# Patient Record
Sex: FEMALE | Race: WHITE | NOT HISPANIC OR LATINO | Employment: UNEMPLOYED | URBAN - METROPOLITAN AREA
[De-identification: names, ages, dates, MRNs, and addresses within clinical notes are randomized per-mention and may not be internally consistent; named-entity substitution may affect disease eponyms.]

---

## 2018-02-27 LAB
BILIRUB SERPL-MCNC: NEGATIVE MG/DL
BLOOD URINE, POC: NEGATIVE
CLARITY, POC UA: NORMAL
COLOR, POC UA: YELLOW
GLUCOSE UR QL STRIP: NEGATIVE
KETONES UR QL STRIP: NEGATIVE
LEUKOCYTE EST, POC UA: NORMAL
NITRITE, POC UA: NEGATIVE
PH, POC UA: 8
POC BETA-HCG (QUAL): NEGATIVE
PROTEIN, POC: NORMAL
SPECIFIC GRAVITY, POC UA: 1.01
UROBILINOGEN, POC UA: NORMAL

## 2019-04-19 ENCOUNTER — HOSPITAL ENCOUNTER (OUTPATIENT)
Dept: NUTRITION | Facility: HOSPITAL | Age: 29
End: 2019-04-22
Attending: INTERNAL MEDICINE | Admitting: INTERNAL MEDICINE

## 2019-04-19 LAB
ABS NEUT (OLG): 5.94 X10(3)/MCL (ref 2.1–9.2)
ALBUMIN SERPL-MCNC: 3.9 GM/DL (ref 3.4–5)
ALBUMIN/GLOB SERPL: 0.9 {RATIO}
ALP SERPL-CCNC: 74 UNIT/L (ref 38–126)
ALT SERPL-CCNC: 21 UNIT/L (ref 12–78)
AMPHET UR QL SCN: NORMAL
APPEARANCE, UA: CLEAR
AST SERPL-CCNC: 16 UNIT/L (ref 15–37)
B-HCG SERPL QL: NEGATIVE
BACTERIA SPEC CULT: ABNORMAL /HPF
BARBITURATE SCN PRESENT UR: NORMAL
BASOPHILS # BLD AUTO: 0.1 X10(3)/MCL (ref 0–0.2)
BASOPHILS NFR BLD AUTO: 1 %
BENZODIAZ UR QL SCN: NORMAL
BILIRUB SERPL-MCNC: 0.2 MG/DL (ref 0.2–1)
BILIRUB UR QL STRIP: NEGATIVE
BILIRUBIN DIRECT+TOT PNL SERPL-MCNC: 0.1 MG/DL (ref 0–0.2)
BILIRUBIN DIRECT+TOT PNL SERPL-MCNC: 0.1 MG/DL (ref 0–0.8)
BUN SERPL-MCNC: 11 MG/DL (ref 7–18)
CALCIUM SERPL-MCNC: 9.5 MG/DL (ref 8.5–10.1)
CANNABINOIDS UR QL SCN: NORMAL
CHLORIDE SERPL-SCNC: 106 MMOL/L (ref 98–107)
CO2 SERPL-SCNC: 25 MMOL/L (ref 21–32)
COCAINE UR QL SCN: NORMAL
COLOR UR: YELLOW
CREAT SERPL-MCNC: 0.68 MG/DL (ref 0.55–1.02)
EOSINOPHIL # BLD AUTO: 0.2 X10(3)/MCL (ref 0–0.9)
EOSINOPHIL NFR BLD AUTO: 2 %
ERYTHROCYTE [DISTWIDTH] IN BLOOD BY AUTOMATED COUNT: 15.5 % (ref 11.5–17)
ETHANOL SERPL-MCNC: 120 MG/DL (ref 0–3)
GLOBULIN SER-MCNC: 4.2 GM/DL (ref 2.4–3.5)
GLUCOSE (UA): NEGATIVE
GLUCOSE SERPL-MCNC: 95 MG/DL (ref 74–106)
HAV IGM SERPL QL IA: NEGATIVE
HBV CORE IGM SERPL QL IA: NEGATIVE
HBV SURFACE AG SERPL QL IA: NEGATIVE
HCT VFR BLD AUTO: 41.7 % (ref 37–47)
HCV AB SERPL QL IA: NEGATIVE
HEPATITIS PANEL INTERP: NORMAL
HGB BLD-MCNC: 13.2 GM/DL (ref 12–16)
HGB UR QL STRIP: NEGATIVE
HIV 1+2 AB+HIV1 P24 AG SERPL QL IA: NEGATIVE
KETONES UR QL STRIP: NEGATIVE
LEUKOCYTE ESTERASE UR QL STRIP: ABNORMAL
LYMPHOCYTES # BLD AUTO: 2.6 X10(3)/MCL (ref 0.6–4.6)
LYMPHOCYTES NFR BLD AUTO: 28 %
MCH RBC QN AUTO: 28.3 PG (ref 27–31)
MCHC RBC AUTO-ENTMCNC: 31.7 GM/DL (ref 33–36)
MCV RBC AUTO: 89.3 FL (ref 80–94)
MONOCYTES # BLD AUTO: 0.4 X10(3)/MCL (ref 0.1–1.3)
MONOCYTES NFR BLD AUTO: 4 %
NEUTROPHILS # BLD AUTO: 5.94 X10(3)/MCL (ref 2.1–9.2)
NEUTROPHILS NFR BLD AUTO: 65 %
NITRITE UR QL STRIP: NEGATIVE
OPIATES UR QL SCN: NORMAL
PCP UR QL: NORMAL
PH UR STRIP.AUTO: 6 [PH] (ref 5–7.5)
PH UR STRIP: >=9 [PH] (ref 5–9)
PLATELET # BLD AUTO: 408 X10(3)/MCL (ref 130–400)
PMV BLD AUTO: 10.4 FL (ref 9.4–12.4)
POTASSIUM SERPL-SCNC: 4 MMOL/L (ref 3.5–5.1)
PROT SERPL-MCNC: 8.1 GM/DL (ref 6.4–8.2)
PROT UR QL STRIP: NEGATIVE
RBC # BLD AUTO: 4.67 X10(6)/MCL (ref 4.2–5.4)
RBC #/AREA URNS HPF: ABNORMAL /[HPF]
SODIUM SERPL-SCNC: 140 MMOL/L (ref 136–145)
SP GR FLD REFRACTOMETRY: 1.01 (ref 1–1.03)
SP GR UR STRIP: 1.01 (ref 1–1.03)
SQUAMOUS EPITHELIAL, UA: 11 /HPF (ref 0–4)
UROBILINOGEN UR STRIP-ACNC: 0.2
WBC # SPEC AUTO: 9.2 X10(3)/MCL (ref 4.5–11.5)
WBC #/AREA URNS HPF: 9 /HPF (ref 0–3)

## 2019-04-20 LAB
ABS NEUT (OLG): 6.92 X10(3)/MCL (ref 2.1–9.2)
BASOPHILS # BLD AUTO: 0.1 X10(3)/MCL (ref 0–0.2)
BASOPHILS NFR BLD AUTO: 1 %
BUN SERPL-MCNC: 10 MG/DL (ref 7–18)
CALCIUM SERPL-MCNC: 8.1 MG/DL (ref 8.5–10.1)
CHLORIDE SERPL-SCNC: 109 MMOL/L (ref 98–107)
CO2 SERPL-SCNC: 21 MMOL/L (ref 21–32)
CREAT SERPL-MCNC: 0.71 MG/DL (ref 0.55–1.02)
CREAT/UREA NIT SERPL: 14.1
EOSINOPHIL # BLD AUTO: 0 X10(3)/MCL (ref 0–0.9)
EOSINOPHIL NFR BLD AUTO: 0 %
ERYTHROCYTE [DISTWIDTH] IN BLOOD BY AUTOMATED COUNT: 15.4 % (ref 11.5–17)
GLUCOSE SERPL-MCNC: 87 MG/DL (ref 74–106)
HCT VFR BLD AUTO: 37.1 % (ref 37–47)
HGB BLD-MCNC: 11.5 GM/DL (ref 12–16)
LYMPHOCYTES # BLD AUTO: 2.4 X10(3)/MCL (ref 0.6–4.6)
LYMPHOCYTES NFR BLD AUTO: 23 %
MCH RBC QN AUTO: 29 PG (ref 27–31)
MCHC RBC AUTO-ENTMCNC: 31 GM/DL (ref 33–36)
MCV RBC AUTO: 93.7 FL (ref 80–94)
MONOCYTES # BLD AUTO: 0.8 X10(3)/MCL (ref 0.1–1.3)
MONOCYTES NFR BLD AUTO: 8 %
NEUTROPHILS # BLD AUTO: 6.92 X10(3)/MCL (ref 2.1–9.2)
NEUTROPHILS NFR BLD AUTO: 66 %
PLATELET # BLD AUTO: 333 X10(3)/MCL (ref 130–400)
PMV BLD AUTO: 10.8 FL (ref 9.4–12.4)
POTASSIUM SERPL-SCNC: 3.7 MMOL/L (ref 3.5–5.1)
RBC # BLD AUTO: 3.96 X10(6)/MCL (ref 4.2–5.4)
SODIUM SERPL-SCNC: 140 MMOL/L (ref 136–145)
WBC # SPEC AUTO: 10.4 X10(3)/MCL (ref 4.5–11.5)

## 2019-04-21 LAB
ABS NEUT (OLG): 8.62 X10(3)/MCL (ref 2.1–9.2)
ALBUMIN SERPL-MCNC: 3.2 GM/DL (ref 3.4–5)
ALBUMIN/GLOB SERPL: 1 RATIO (ref 1.1–2)
ALP SERPL-CCNC: 57 UNIT/L (ref 38–126)
ALT SERPL-CCNC: 15 UNIT/L (ref 12–78)
AST SERPL-CCNC: 10 UNIT/L (ref 15–37)
BASOPHILS # BLD AUTO: 0.1 X10(3)/MCL (ref 0–0.2)
BASOPHILS NFR BLD AUTO: 0 %
BILIRUB SERPL-MCNC: 0.4 MG/DL (ref 0.2–1)
BILIRUBIN DIRECT+TOT PNL SERPL-MCNC: 0.1 MG/DL (ref 0–0.5)
BILIRUBIN DIRECT+TOT PNL SERPL-MCNC: 0.3 MG/DL (ref 0–0.8)
BUN SERPL-MCNC: 9 MG/DL (ref 7–18)
CALCIUM SERPL-MCNC: 8.7 MG/DL (ref 8.5–10.1)
CHLORIDE SERPL-SCNC: 107 MMOL/L (ref 98–107)
CO2 SERPL-SCNC: 24 MMOL/L (ref 21–32)
CREAT SERPL-MCNC: 0.72 MG/DL (ref 0.55–1.02)
EOSINOPHIL # BLD AUTO: 0 X10(3)/MCL (ref 0–0.9)
EOSINOPHIL NFR BLD AUTO: 0 %
ERYTHROCYTE [DISTWIDTH] IN BLOOD BY AUTOMATED COUNT: 15.4 % (ref 11.5–17)
GLOBULIN SER-MCNC: 3.3 GM/DL (ref 2.4–3.5)
GLUCOSE SERPL-MCNC: 92 MG/DL (ref 74–106)
HCT VFR BLD AUTO: 34.8 % (ref 37–47)
HGB BLD-MCNC: 11.2 GM/DL (ref 12–16)
LYMPHOCYTES # BLD AUTO: 2.2 X10(3)/MCL (ref 0.6–4.6)
LYMPHOCYTES NFR BLD AUTO: 18 %
MAGNESIUM SERPL-MCNC: 1.9 MG/DL (ref 1.8–2.4)
MCH RBC QN AUTO: 29.2 PG (ref 27–31)
MCHC RBC AUTO-ENTMCNC: 32.2 GM/DL (ref 33–36)
MCV RBC AUTO: 90.9 FL (ref 80–94)
MONOCYTES # BLD AUTO: 1 X10(3)/MCL (ref 0.1–1.3)
MONOCYTES NFR BLD AUTO: 8 %
NEUTROPHILS # BLD AUTO: 8.62 X10(3)/MCL (ref 2.1–9.2)
NEUTROPHILS NFR BLD AUTO: 72 %
PLATELET # BLD AUTO: 370 X10(3)/MCL (ref 130–400)
PMV BLD AUTO: 10.8 FL (ref 9.4–12.4)
POTASSIUM SERPL-SCNC: 3.4 MMOL/L (ref 3.5–5.1)
PROT SERPL-MCNC: 6.5 GM/DL (ref 6.4–8.2)
RBC # BLD AUTO: 3.83 X10(6)/MCL (ref 4.2–5.4)
SODIUM SERPL-SCNC: 139 MMOL/L (ref 136–145)
WBC # SPEC AUTO: 12 X10(3)/MCL (ref 4.5–11.5)

## 2019-04-22 LAB
ABS NEUT (OLG): 4.18 X10(3)/MCL (ref 2.1–9.2)
ALBUMIN SERPL-MCNC: 3.1 GM/DL (ref 3.4–5)
ALBUMIN/GLOB SERPL: 1 RATIO (ref 1.1–2)
ALP SERPL-CCNC: 49 UNIT/L (ref 38–126)
ALT SERPL-CCNC: 13 UNIT/L (ref 12–78)
AST SERPL-CCNC: 7 UNIT/L (ref 15–37)
BASOPHILS # BLD AUTO: 0.1 X10(3)/MCL (ref 0–0.2)
BASOPHILS NFR BLD AUTO: 1 %
BILIRUB SERPL-MCNC: 0.2 MG/DL (ref 0.2–1)
BILIRUBIN DIRECT+TOT PNL SERPL-MCNC: 0.1 MG/DL (ref 0–0.5)
BILIRUBIN DIRECT+TOT PNL SERPL-MCNC: 0.1 MG/DL (ref 0–0.8)
BUN SERPL-MCNC: 10 MG/DL (ref 7–18)
CALCIUM SERPL-MCNC: 8.8 MG/DL (ref 8.5–10.1)
CHLORIDE SERPL-SCNC: 108 MMOL/L (ref 98–107)
CO2 SERPL-SCNC: 26 MMOL/L (ref 21–32)
CREAT SERPL-MCNC: 0.69 MG/DL (ref 0.55–1.02)
EOSINOPHIL # BLD AUTO: 0.1 X10(3)/MCL (ref 0–0.9)
EOSINOPHIL NFR BLD AUTO: 1 %
ERYTHROCYTE [DISTWIDTH] IN BLOOD BY AUTOMATED COUNT: 15.3 % (ref 11.5–17)
GLOBULIN SER-MCNC: 3.1 GM/DL (ref 2.4–3.5)
GLUCOSE SERPL-MCNC: 96 MG/DL (ref 74–106)
HCT VFR BLD AUTO: 34.1 % (ref 37–47)
HGB BLD-MCNC: 10.8 GM/DL (ref 12–16)
LYMPHOCYTES # BLD AUTO: 3.2 X10(3)/MCL (ref 0.6–4.6)
LYMPHOCYTES NFR BLD AUTO: 38 %
MAGNESIUM SERPL-MCNC: 2 MG/DL (ref 1.8–2.4)
MCH RBC QN AUTO: 28.3 PG (ref 27–31)
MCHC RBC AUTO-ENTMCNC: 31.7 GM/DL (ref 33–36)
MCV RBC AUTO: 89.3 FL (ref 80–94)
MONOCYTES # BLD AUTO: 0.9 X10(3)/MCL (ref 0.1–1.3)
MONOCYTES NFR BLD AUTO: 10 %
NEUTROPHILS # BLD AUTO: 4.18 X10(3)/MCL (ref 2.1–9.2)
NEUTROPHILS NFR BLD AUTO: 49 %
PLATELET # BLD AUTO: 314 X10(3)/MCL (ref 130–400)
PMV BLD AUTO: 10.8 FL (ref 9.4–12.4)
POTASSIUM SERPL-SCNC: 3.8 MMOL/L (ref 3.5–5.1)
PROT SERPL-MCNC: 6.2 GM/DL (ref 6.4–8.2)
RBC # BLD AUTO: 3.82 X10(6)/MCL (ref 4.2–5.4)
SODIUM SERPL-SCNC: 141 MMOL/L (ref 136–145)
WBC # SPEC AUTO: 8.5 X10(3)/MCL (ref 4.5–11.5)

## 2021-12-21 ENCOUNTER — HISTORICAL (OUTPATIENT)
Dept: RADIOLOGY | Facility: HOSPITAL | Age: 31
End: 2021-12-21

## 2021-12-27 ENCOUNTER — HISTORICAL (OUTPATIENT)
Dept: RADIOLOGY | Facility: HOSPITAL | Age: 31
End: 2021-12-27

## 2022-04-11 ENCOUNTER — HISTORICAL (OUTPATIENT)
Dept: ADMINISTRATIVE | Facility: HOSPITAL | Age: 32
End: 2022-04-11
Payer: MEDICAID

## 2022-04-27 VITALS
OXYGEN SATURATION: 99 % | SYSTOLIC BLOOD PRESSURE: 100 MMHG | BODY MASS INDEX: 40.08 KG/M2 | HEIGHT: 62 IN | DIASTOLIC BLOOD PRESSURE: 69 MMHG | WEIGHT: 217.81 LBS

## 2022-04-30 NOTE — DISCHARGE SUMMARY
Patient:   Letty Nunez            MRN: [id number]            FIN: [id number]               Age:   28 years     Sex:  Female     :  1990   Associated Diagnoses:   None   Author:   Ludy Robert MD      Discharge Information      Discharge Summary Information   Admit/Discharge Dates   Admit Date: 2019  Discharge Date: 2019   Procedures   No procedures recorded for this visit.      Hospital Course     Hospital course: 28-year-old woman who is here basically as a detoxification from opioids, alcoholism.  She has been using IV heroin and IV methamphetamines.  She was supposed to be at a psychiatric facility but she had a questionable seizure versus pseudoseizure.  Workup has been unremarkable.  She did have some tachycardia.  A CT scan PE protocol was obtained which was negative.  She was treated with essentially supportive care.  She did receive some clonidine, Bentyl, NSAIDs for potential withdrawals.  She also did get trazodone which helped with sleep which was essentially the catalyst for her to say that she was ready to go home.  There were no any seizure episodes here.  She will be discharged and she can go to the detox facility from here.    Admission/discharge diagnoses    Polysubstance abuse  Tachycardia resolved  Pseudoseizure      Physical Examination   General:  Alert and oriented, No acute distress.    Neck:  Supple, Non-tender.    Respiratory:  Lungs are clear to auscultation, Respirations are non-labored.    Cardiovascular:  Normal rate, Regular rhythm.    Gastrointestinal:  Soft, Non-tender.       Discharge Plan   Discharge Summary Plan   Discharge disposition: discharge to home.     Orders     Orders   Patient Care:  Give all scheduled vaccinations prior to discharge. (Order): 2019 7:54 CDT, Give all scheduled vaccinations prior to discharge.  Discontinue IV (Order): 2019 7:54 CDT  Pharmacy:  TRAZODONE 300MG TABLETS (Discontinue): 2019 7:53  CDT  D-AMPHETAMINE SALT COMBO 20MG TABS (Discontinue): 4/22/2019 7:53 CDT  KETOROLAC 10 MG TABLET (Discontinue): 4/22/2019 7:53 CDT  PREDNISONE 20 MG TABLET (Discontinue): 4/22/2019 7:53 CDT  AMOXICILLIN 875 MG TABLET (Discontinue): 4/22/2019 7:53 CDT  PROMETHAZINE-CODEINE SYRUP (Discontinue): 4/22/2019 7:53 CDT  DEXTROAMP-AMPHET ER 20 MG CAP (Discontinue): 4/22/2019 7:53 CDT  OFLOXACIN 0.3% EAR DROPS (Discontinue): 4/22/2019 7:53 CDT  Colace 50 mg oral capsule (Discontinue): 4/22/2019 7:53 CDT  diclofenac sodium 75 mg oral delayed release tablet (Discontinue): 4/22/2019 7:53 CDT  PROzac (Order): 40 mg, form: Cap, Oral, Daily, first dose 4/22/2019 9:00 CDT  Admit/Transfer/Discharge:  Discharge Activity (Order): Exercise as Tolerated  Discharge (Order): 4/22/2019 7:54 CDT, Home, Give all scheduled vaccinations prior to discharge..        Course   Progressing as expected.     Education and Follow-up   Counseled: patient, regarding diagnosis, regarding treatment, regarding medications.     Discharge Planning: Opioid Withdrawal, Opioid Use Disorder, Report to Emergency Department if symptoms return or worsen; Follow up with primary care provider,     time spent on discharge disposition included the following: final examination of the patient; discussion of the hospital stay; instructions for continuing care; final diagnoses; patient/family counseling; preparation of discharge records; preparation of prescriptions; referral forms; chart review.  Total time spent on discharge disposition was 33 minutes.   .

## 2022-04-30 NOTE — ED PROVIDER NOTES
"   Patient:   Letty Nunez            MRN: [id number]            FIN: [id number]               Age:   28 years     Sex:  Female     :  1990   Associated Diagnoses:   Opiate withdrawal; Sinus tachycardia   Author:   Jayme Dobbs MD      Basic Information   Time seen: Date & time 2019 16:10:00.   History source: Patient, father.   Arrival mode: Ambulance.   History limitation: None.   Additional information: Chief Complaint from Nursing Triage Note : Chief Complaint   2019 16:01 CDT      Chief Complaint           EMS reports pt had "seizure like activity." Pt was awake and responsive upon arrival. Required redirection to calm. States detoxing from heroin. Last use last night . Pt states drank x3 pints whiskey this am. Pt tearful/anxious in triage. [id number]  .      History of Present Illness   The patient presents with This is a 28-year-old female brought in by eminence after having a possible seizure.  Patient does have a history of heroin use and he does report her last use around 10:00 last night.  Patient also drank roughly 3 pints of whiskey this morning. and     I, Dr. Dobbs, assumed care of this patient at 1625.    28 year old female with hx of IV heroin abuse, crystal meth abuse, and ETOH abuse presents to the ED via EMS from the [facility] where father reports seizure like activity PTA , consisting of patient jerking both arms while she was sitting down, there is no postictal  period however. Pt's father states that patient was clean for 2 years and recently began using meth, ETOH, and heroin again. Most recently used IV heroin last night around . Pt reportedly drank 3 pints of whisky this AM as well.  Pt states she hasn't had a seizure in 5 years since her last withdrawal. Pt was going to [facility] for voluntary admission..  The onset was just prior to arrival.  The course/duration of symptoms is episodic: with a single episode.  Location: generalized. The " character of symptoms is seizure like activity.  The degree at onset was unknown.  The degree at present is none.  Risk factors consist of drug abuse, ETOH abuse.  Therapy today: emergency medical services.  Associated symptoms: none.  Additional history: none.        Review of Systems   Constitutional symptoms:  No fever, no chills, no sweats, no weakness, no fatigue, no decreased activity.    Skin symptoms:  No jaundice, no rash, no pruritus, no abrasions, no breakdown, no burns, no dryness, no petechiae, no lesion.    Eye symptoms:  Vision unchanged, no pain, no blurred vision.    ENMT symptoms:  No ear pain, no sore throat, no nasal congestion, no sinus pain.    Respiratory symptoms:  No shortness of breath, no orthopnea, no cough, no hemoptysis, no stridor, no wheezing.    Cardiovascular symptoms:  No chest pain, no palpitations, no tachycardia, no syncope, no diaphoresis, no peripheral edema.    Gastrointestinal symptoms:  No abdominal pain, no nausea, no vomiting, no diarrhea, no constipation, no rectal bleeding, no rectal pain.    Genitourinary symptoms:  No dysuria, no hematuria.    Musculoskeletal symptoms:  No back pain, no Muscle pain, no Joint pain.    Neurologic symptoms:  No headache, no dizziness, no altered level of consciousness, no numbness, no tingling, no weakness.    Psychiatric symptoms:  Anxiety, substance abuse, No depression,              Additional review of systems information: All systems reviewed as documented in chart.      Health Status   Allergies:    Allergic Reactions (Selected)  Severity Not Documented  Morphine- Rash.  Sulfa drugs- Anaphylaxis..   Medications:  (Selected)   Inpatient Medications  Ordered  IVF Normal Saline NS Infusion 1,000 mL: 1,000 mL, 1,000 mL, IV, 1,000 mL/hr, start date 04/19/19 16:11:00 CDT  Prescriptions  Prescribed  diclofenac sodium 75 mg oral delayed release tablet: 75 mg = 1 tab(s), Oral, BID, # 10 tab(s), 0 Refill(s), Pharmacy: [facility]/pharmacy  #[id number]  Documented Medications  Documented  AMOXICILLIN 875 MG TABLET: 875 mg = 1 tab(s), Oral, BID  Colace 50 mg oral capsule: 50 mg = 1 cap(s), Oral, BID, 0 Refill(s)  D-AMPHETAMINE SALT COMBO 20MG TABS:   DEXTROAMP-AMPHET ER 20 MG CAP: 20 mg = 1 cap(s), Oral, Daily  KETOROLAC 10 MG TABLET: 10 mg = 1 tab(s), Oral, q6hr  OFLOXACIN 0.3% EAR DROPS:   PREDNISONE 20 MG TABLET: 20 mg = 1 tab(s), Oral, BID  PROMETHAZINE-CODEINE SYRUP: Oral, q4hr  PROzac 40 mg oral capsule: 40 mg = 1 cap(s), Oral, Daily, 0 Refill(s)  TRAZODONE 300MG TABLETS: 300 mg = 1 tab(s), Oral, qPM  .   Immunizations: Per nurse's notes.   Menstrual history: Per nurse's notes.      Past Medical/ Family/ Social History   Medical history:    Resolved  Hypertension ([id number]):  Resolved.  Depression ([id number]):  Resolved.  , ETOH abuse, Drug abuse.   Surgical history:    Tubes ([id number]).  kidney stents.  Tubal ligation ([id number]).  .   Family history:    Thyroid disorder  Mother  Heart attack  Father  .   Social history: Alcohol use: Regularly, history of alcohol abuse, Drug use: Heroin, methamphetamines.      Physical Examination               Vital Signs   Vital Signs   4/19/2019 16:01 CDT      Temperature Oral          36.8 DegC                             Temperature Oral (calculated)             98.24 DegF                             Peripheral Pulse Rate     108 bpm  HI                             Respiratory Rate          18 br/min                             SpO2                      98 %                             Oxygen Therapy            Room air                             Systolic Blood Pressure   150 mmHg  HI                             Diastolic Blood Pressure  92 mmHg  HI  .   Measurements   4/19/2019 16:01 CDT      Weight Dosing             77 kg                             Weight Measured and Calculated in Lbs     169.75 lb                             Weight Estimated          77 kg                             Height/Length Dosing       160 cm                             Height/Length Estimated   160 cm                             Body Mass Index Estimated 30.08 kg/m2  .   Basic Oxygen Information   4/19/2019 16:01 CDT      SpO2                      98 %                             Oxygen Therapy            Room air  .   General:  Mild distress, anxious.    Skin:  Warm, dry, pink, intact, No abscess to arms, No cellulitis.    Head:  Normocephalic, atraumatic.    Neck:  Supple, trachea midline, no tenderness.    Ears, nose, mouth and throat:  Oral mucosa moist.   Cardiovascular:  No murmur, Normal peripheral perfusion, No edema, Tachycardia.    Respiratory:  Lungs are clear to auscultation, respirations are non-labored, breath sounds are equal, Symmetrical chest wall expansion.    Back:  Nontender, Normal range of motion.    Musculoskeletal:  Normal ROM, normal strength, no tenderness, no swelling, no deformity.    Neurological:  Alert and oriented to person, place, time, and situation, No focal neurological deficit observed, normal sensory observed, normal motor observed, normal speech observed.    Psychiatric:  Cooperative.      Medical Decision Making   Documents reviewed:  Emergency department nurses' notes.   Orders  Launch Orders   Laboratory:  Urine Drug Screen (Order): Stat collect, Urine, 4/19/2019 16:11 CDT, Nurse collect, Print Label By Order Location  UPT - Urine Pregancy Test (Order): Stat collect, Urine, 4/19/2019 16:11 CDT, Nurse collect, See MD to Nurse order., 4/19/2019 16:11 CDT  Urinalysis Complete a reflex to culture (Order): Stat collect, Urine, 4/19/2019 16:11 CDT, Nurse collect, See MD to Nurse order., Print Label By Order Location  CMP (Order): Stat collect, 4/19/2019 16:11 CDT, Blood, Lab Collect, Print Label By Order Location, 4/19/2019 16:11 CDT  CBC w/ Auto Diff (Order): Stat collect, 4/19/2019 16:11 CDT, Blood, Lab Collect, Print Label By Order Location, 4/19/2019 16:11 CDT  Patient Care:  Vital Signs Notify Provider  (Order): 4/19/2019 16:11 CDT, Immediately for BP <90/60., SBP < 90, DBP < 60  Saline Lock Insert (Order): 4/19/2019 16:11 CDT  Pharmacy:  IVF Normal Saline NS Infusion 1,000 mL (Order): 1,000 mL, 1,000 mL, IV, 1,000 mL/hr, start date 4/19/2019 16:11 CDT.   Electrocardiogram:  Time 4/19/2019 23:28:00, rate 116, no ectopy, normal [place] & QRS intervals, EP Interp, The Rhythm is sinus tachycardia.  , T wave Flattened.    Results review:  Lab results : Lab View   4/19/2019 17:21 CDT      U Beta hCG Ql             Negative                             U pH                      6.0                             U Spec Grav               1.012                             UA Appear                 CLEAR                             UA Color                  YELLOW                             UA Spec Grav              1.012                             UA Bili                   Negative                             UA pH                     >=9.0                             UA Urobilinogen           0.2                             UA Blood                  Negative                             UA Glucose                Negative                             UA Ketones                Negative                             UA Protein                Negative                             UA Nitrite                Negative                             UA Leuk Est               3+                             UA WBC                    9 /HPF  HI                             UA RBC                    NONE SEEN                             UA Bacteria               1+ /HPF                             UA Squam Epithelial       11 /HPF  HI                             U Amph Scr                NEG                             U Marilyn Scr                NEG                             U Benzodia Scr            NEG                             U Cannab Scr              NEG                             U Cocaine Scr             NEG                             U  Opiate Scr              POS                             U Phencyclidine Scr       NEG    4/19/2019 16:34 CDT      Sodium Lvl                140 mmol/L                             Potassium Lvl             4.0 mmol/L                             Chloride                  106 mmol/L                             CO2                       25.0 mmol/L                             Calcium Lvl               9.5 mg/dL                             Glucose Lvl               95 mg/dL                             BUN                       11.0 mg/dL                             Creatinine                0.68 mg/dL                             eGFR-AA                   >60 mL/min/1.73 m2  NA                             eGFR-TMAICA                  >60 mL/min/1.73 m2  NA                             Bili Total                0.2 mg/dL                             Bili Direct               0.10 mg/dL                             Bili Indirect             0.10 mg/dL                             AST                       16 unit/L                             ALT                       21 unit/L                             Alk Phos                  74 unit/L                             Total Protein             8.1 gm/dL                             Albumin Lvl               3.90 gm/dL                             Globulin                  4.20 gm/dL  HI                             A/G Ratio                 0.9  NA                             WBC                       9.2 x10(3)/mcL                             RBC                       4.67 x10(6)/mcL                             Hgb                       13.2 gm/dL                             Hct                       41.7 %                             Platelet                  408 x10(3)/mcL  HI                             MCV                       89.3 fL                             MCH                       28.3 pg                             MCHC                      31.7 gm/dL  LOW                              RDW                       15.5 %                             MPV                       10.4 fL                             Abs Neut                  5.94 x10(3)/mcL                             Neutro Auto               65 %  NA                             Lymph Auto                28 %  NA                             Mono Auto                 4 %  NA                             Eos Auto                  2 %  NA                             Abs Eos                   0.2 x10(3)/mcL                             Basophil Auto             1 %  NA                             Abs Neutro                5.94 x10(3)/mcL                             Abs Lymph                 2.6 x10(3)/mcL                             Abs Mono                  0.4 x10(3)/mcL                             Abs Baso                  0.1 x10(3)/mcL                             Ethanol Lvl               120.0 mg/dL  HI                             Hep Bs Ag                 Negative    ,    No qualifying data available.    Radiology results:  Rad Results (ST)  < 12 hrs   Accession: [id number]  Order: CT Angio Chest PE W Contrast  Report Dt/Tm: 04/19/2019 21:30  Report:   EXAMINATION: CT the thorax with contrast; PE protocol     EXAMINATION DATE: 4/19/2019     COMPARISON: None     TECHNIQUE: Multiple cross-sectional thorax were obtained after the  intravenous administration of contrast. Coronal and sagittal  reformatted images were obtained. MIP images were also obtained.     CLINICAL HISTORY: Chest pain     FINDINGS:     The heart size is within normal limits. No shift of the  interventricular septum no reflux of contrast into the IVC.. The  course and caliber of the thoracic aorta is normal. There is a triple  vessel arch great vessels are patent. The main pulmonary artery is of  normal caliber without central or distal filling defect to suggest  pulmonary embolism. No hilar or mediastinal adenopathy.     Evaluation of the lung parenchyma  is limited secondary to motion.  Bibasilar atelectatic changes. No evidence for pulmonary infarct or  consolidation. No pleural thickening or pleural effusion. Trachea and  airway are patent.     The visualized hollow and solid viscera of the upper abdomen.      No suspicious osseous lesions. Soft tissues are normal.     IMPRESSION:   1. No evidence for pulmonary embolism.  2. No acute process of the thorax. Secondary findings as above.      .      Reexamination/ Reevaluation   Time: 4/19/2019 19:12:00 .   Vital signs   results included from flowsheet : Vital Signs   4/19/2019 18:30 CDT      Peripheral Pulse Rate     126 bpm  HI                             Heart Rate Monitored      127 bpm  HI                             Respiratory Rate          14 br/min                             SpO2                      97 %                             Oxygen Therapy            Room air                             Systolic Blood Pressure   157 mmHg  HI                             Diastolic Blood Pressure  105 mmHg  HI                             Mean Arterial Pressure, Cuff              122 mmHg    4/19/2019 18:00 CDT      Peripheral Pulse Rate     119 bpm  HI                             Heart Rate Monitored      116 bpm  HI                             Respiratory Rate          19 br/min                             SpO2                      93 %  LOW                             Oxygen Therapy            Room air                             Systolic Blood Pressure   152 mmHg  HI                             Diastolic Blood Pressure  108 mmHg  HI                             Mean Arterial Pressure, Cuff              123 mmHg    4/19/2019 17:30 CDT      Peripheral Pulse Rate     114 bpm  HI                             Heart Rate Monitored      110 bpm  HI                             Respiratory Rate          21 br/min                             SpO2                      96 %                             Oxygen Therapy             Room air                             Systolic Blood Pressure   143 mmHg  HI                             Diastolic Blood Pressure  103 mmHg  HI                             Mean Arterial Pressure, Cuff              116 mmHg    4/19/2019 17:00 CDT      Temperature Oral          36.8 DegC                             Temperature Oral (calculated)             98.24 DegF                             Peripheral Pulse Rate     128 bpm  HI                             Heart Rate Monitored      140 bpm  HI                             Respiratory Rate          27 br/min  HI                             SpO2                      97 %                             Oxygen Therapy            Room air                             Systolic Blood Pressure   147 mmHg  HI                             Diastolic Blood Pressure  101 mmHg  HI                             Mean Arterial Pressure, Cuff              116 mmHg    4/19/2019 16:38 CDT      Peripheral Pulse Rate     128 bpm  HI                             Heart Rate Monitored      126 bpm  HI                             Respiratory Rate          17 br/min                             SpO2                      98 %                             Oxygen Therapy            Room air                             Systolic Blood Pressure   127 mmHg                             Diastolic Blood Pressure  81 mmHg                             Mean Arterial Pressure, Cuff              96 mmHg    4/19/2019 16:01 CDT      Temperature Oral          36.8 DegC                             Temperature Oral (calculated)             98.24 DegF                             Peripheral Pulse Rate     108 bpm  HI                             Respiratory Rate          18 br/min                             SpO2                      98 %                             Oxygen Therapy            Room air                             Systolic Blood Pressure   150 mmHg  HI                             Diastolic Blood Pressure  92 mmHg   HI     Basic Oxygen Information   4/19/2019 16:01 CDT      SpO2                      98 %                             Oxygen Therapy            Room air     Course: Patient still is tachycardic.  She appears to be in no distress..   Time: 4/19/2019 23:03:00 .   Course: Patient still has a sinus tachycardia, worsens with movement.  Other vital signs are stable.  CT is negative for PE..      Impression and Plan   Diagnosis   Opiate withdrawal (CIW20-IX F11.23)   Sinus tachycardia (VRP40-RG R00.0)      Calls-Consults   -  4/19/2019 23:02:00 , Priyanka CARD, okay to admit.    Plan   Condition: Stable.    Disposition: Admit time  4/19/2019 23:02:00, Place in Observation Telemetry Unit,  Discharged: Time  4/19/2019 19:13:00, to home, Father is to take patient to [facility]  Discharged: Time  4/19/2019 19:13:00, to home    Patient was given the following educational materials:  Opioid Use Disorder, Opioid Withdrawal    Follow up with:  Follow up with primary care provider; Report to Emergency Department if symptoms return or worsen    Counseled: Patient, Family, Regarding diagnosis, Regarding diagnostic results, Regarding treatment plan, Patient indicated understanding of instructions, Family at bedside understood.    Notes: I, Markie Baeza, acted solely as a scribe for and in the presence of Dr. Dobbs who performed the service., I acknowledged that the documentation on this chart was provided by a scribe on the date of service noted above and that the documentation in the chart accurately reflects work and decisions made by me alone..

## 2022-08-17 ENCOUNTER — TELEPHONE (OUTPATIENT)
Dept: ENDOSCOPY | Facility: HOSPITAL | Age: 32
End: 2022-08-17
Payer: MEDICAID

## 2022-09-21 ENCOUNTER — HISTORICAL (OUTPATIENT)
Dept: ADMINISTRATIVE | Facility: HOSPITAL | Age: 32
End: 2022-09-21
Payer: MEDICAID

## 2023-05-16 ENCOUNTER — TELEPHONE (OUTPATIENT)
Dept: PRIMARY CARE CLINIC | Facility: CLINIC | Age: 33
End: 2023-05-16
Payer: MEDICAID

## 2023-05-16 NOTE — TELEPHONE ENCOUNTER
----- Message from Johnny Waters sent at 5/16/2023 12:53 PM CDT -----  Contact: [phone]  Pt is calling in regards to scheduling a new patient appt. Pt was informed by  that department  panel are full for new patient and patient does have medicaid.  Pt stated that she spoke with provider and was informed she would be able to be seen by him. Please call her back at [phone]. Thanks KB

## 2023-05-16 NOTE — TELEPHONE ENCOUNTER
Spoke with pt and advised her that Dr. Hodge is not taking new medicaid at this time. Pt verbalized understanding.